# Patient Record
Sex: FEMALE | Race: WHITE
[De-identification: names, ages, dates, MRNs, and addresses within clinical notes are randomized per-mention and may not be internally consistent; named-entity substitution may affect disease eponyms.]

---

## 2018-06-25 ENCOUNTER — HOSPITAL ENCOUNTER (EMERGENCY)
Dept: HOSPITAL 97 - ER | Age: 38
Discharge: HOME | End: 2018-06-25
Payer: COMMERCIAL

## 2018-06-25 DIAGNOSIS — L24.9: Primary | ICD-10-CM

## 2018-06-25 DIAGNOSIS — H66.009: ICD-10-CM

## 2018-06-25 PROCEDURE — 99283 EMERGENCY DEPT VISIT LOW MDM: CPT

## 2018-06-25 PROCEDURE — 96372 THER/PROPH/DIAG INJ SC/IM: CPT

## 2018-06-25 NOTE — EDPHYS
Physician Documentation                                                                           

 [facility]                                                                

Name: Doris Hadley                                                                                

Age: 38 yrs                                                                                       

Sex: Female                                                                                       

: 1980                                                                                   

MRN: [id number]                                                                                   

Arrival Date: 2018                                                                          

Time: 17:18                                                                                       

Account#: [id number]                                                                            

Bed 27                                                                                            

Private MD: FLORENCIO HEADLEY ED Physician Christopher Velazquez                                                                      

HPI:                                                                                              

                                                                                             

18:21 This 38 yrs old  Female presents to ER via Ambulatory with complaints of       snw 

      Allergic Reaction.                                                                          

18:21 The patient presents with localized swelling. Onset: The symptoms/episode               snw 

      began/occurred suddenly, 2 day(s) ago, and became worse and became persistent.              

      Associated signs and symptoms: Pertinent positives: The patient has no apparent             

      associated signs or symptoms. Possible causes: sun/sand/excessive heat/wind. Severity       

      of symptoms: At their worst the symptoms were moderate. The patient has not experienced     

      similar symptoms in the past. The patient has not recently seen a physician.                

                                                                                                  

OB/GYN:                                                                                           

17:30 LMP 2018                                                                           aj  

                                                                                                  

Historical:                                                                                       

- Allergies:                                                                                      

17:30 No Known Allergies;                                                                     aj  

- Home Meds:                                                                                      

17:30 None [Active];                                                                          aj  

- PMHx:                                                                                           

17:30 None;                                                                                   aj  

- PSHx:                                                                                           

17:30 None;                                                                                   aj  

                                                                                                  

- Immunization history:: Adult Immunizations up to date.                                          

- Social history:: Smoking status: Patient uses tobacco products, smokes one-half pack            

  cigarettes per day.                                                                             

- Ebola Screening: : Patient negative for fever greater than or equal to 101.5 degrees            

  Fahrenheit, and additional compatible Ebola Virus Disease symptoms Patient denies               

  exposure to infectious person Patient denies travel to an Ebola-affected area in the            

  21 days before illness onset No symptoms or risks identified at this time.                      

                                                                                                  

                                                                                                  

ROS:                                                                                              

18:20 Constitutional: Negative for fever, chills, and weight loss, positive forehead, facial  snw 

      edema Eyes: Negative for injury, pain, redness, and discharge, ENT: Negative for            

      injury, pain, and discharge, Neck: Negative for injury, pain, and swelling,                 

      Cardiovascular: Negative for chest pain, palpitations, and edema, Respiratory: Negative     

      for shortness of breath, cough, wheezing, and pleuritic chest pain, Abdomen/GI:             

      Negative for abdominal pain, nausea, vomiting, diarrhea, and constipation, Back:            

      Negative for injury and pain, : Negative for injury, bleeding, discharge, and             

      swelling, MS/Extremity: Negative for injury and deformity, Skin: Negative for injury,       

      rash, and discoloration, Neuro: Negative for headache, weakness, numbness, tingling,        

      and seizure, Psych: Negative for depression, anxiety, suicide ideation, homicidal           

      ideation, and hallucinations.                                                               

                                                                                                  

Exam:                                                                                             

18:19 Constitutional:  This is a well developed, well nourished patient who is awake, alert,  snw 

      and in no acute distress. Eyes:  Pupils equal round and reactive to light, extra-ocular     

      motions intact.  Lids and lashes normal.  Conjunctiva and sclera are non-icteric and        

      not injected.  Cornea within normal limits.  Periorbital areas with no swelling,            

      redness, or edema. Neck:  Trachea midline, no thyromegaly or masses palpated, and no        

      cervical lymphadenopathy.  Supple, full range of motion without nuchal rigidity, or         

      vertebral point tenderness.  No Meningismus. Chest/axilla:  Normal chest wall               

      appearance and motion.  Nontender with no deformity.  No lesions are appreciated.           

      Cardiovascular:  Regular rate and rhythm with a normal S1 and S2.  No gallops, murmurs,     

      or rubs.  Normal PMI, no JVD.  No pulse deficits. Respiratory:  Lungs have equal breath     

      sounds bilaterally, clear to auscultation and percussion.  No rales, rhonchi or wheezes     

      noted.  No increased work of breathing, no retractions or nasal flaring. Abdomen/GI:        

      Soft, non-tender, with normal bowel sounds.  No distension or tympany.  No guarding or      

      rebound.  No evidence of tenderness throughout. Back:  No spinal tenderness.  No            

      costovertebral tenderness.  Full range of motion. Skin:  Warm, dry with normal turgor.      

      Normal color with no rashes, no lesions, and no evidence of cellulitis. MS/ Extremity:      

      Pulses equal, no cyanosis.  Neurovascular intact.  Full, normal range of motion. Neuro:     

       Awake and alert, GCS 15, oriented to person, place, time, and situation.  Cranial          

      nerves II-XII grossly intact.  Motor strength 5/5 in all extremities.  Sensory grossly      

      intact.  Cerebellar exam normal.  Normal gait.                                              

18:19 Head/face: Noted is swelling, that is moderate, of the  forehead, right eye and left        

      eye.                                                                                        

18:19 ENT: TM's: erythema, that is moderate, on the left, Nose: is normal, Mouth: is normal,      

      Posterior pharynx: is normal, Voice: is normal.                                             

                                                                                                  

Vital Signs:                                                                                      

17:30  / 65; Pulse 82; Resp 19; Temp 97.4; Pulse Ox 99% on R/A; Weight 61.23 kg; Height aj  

      5 ft. 4 in. (162.56 cm);                                                                    

18:57  / 67; Pulse 79; Resp 20; Temp 98.5; Pulse Ox 99% on R/A;                         aj  

17:30 Body Mass Index 23.17 (61.23 kg, 162.56 cm)                                             aj  

                                                                                                  

MDM:                                                                                              

17:54 Patient medically screened.                                                             crissy 

18:23 Data reviewed: vital signs, nurses notes. Data interpreted: Pulse oximetry: on room air snw 

      is 99 %. Counseling: I had a detailed discussion with the patient and/or guardian           

      regarding: the historical points, exam findings, and any diagnostic results supporting      

      the discharge/admit diagnosis, the need for outpatient follow up, to return to the          

      emergency department if symptoms worsen or persist or if there are any questions or         

      concerns that arise at home. Special discussion: Based on the history and exam              

      findings, there is no indication for further emergent testing or inpatient evaluation.      

      I discussed with the patient/guardian the need to see the primary care provider for         

      further evaluation of the symptoms.                                                         

                                                                                                  

                                                                                             

17:36 Order name: Diet Regular; Complete Time: 17:37                                          bd  

                                                                                             

18:15 Order name: Ice pack; Complete Time: 18:39                                              snw 

                                                                                                  

Administered Medications:                                                                         

18:38 Drug: Bicillin L-A 2.4 million units {Note: Split in two doses and given in bilateral   ed1 

      gluteus.} Route: IM; Site: right gluteus;                                                   

18:58 Follow up: Response: No adverse reaction                                                  

                                                                                                  

                                                                                                  

Disposition:                                                                                      

                                                                                             

06:55 Co-signature as Attending Physician, Christopher Velazquez MD I agree with the assessment and  [facility] 

      plan of care.                                                                               

                                                                                                  

Disposition:                                                                                      

18 18:23 Discharged to Home. Impression: Acute suppurative otitis media, Irritant           

  contact dermatitis, unspecified cause.                                                          

- Condition is Stable.                                                                            

- Discharge Instructions: Contact Dermatitis, Otitis Media, Adult, Cryotherapy.                   

                                                                                                  

- Medication Reconciliation Form, Thank You Letter, Antibiotic Education, Prescription            

  Opioid Use form.                                                                                

- Follow up: Private Physician; When: 2 - 3 days; Reason: Recheck today's complaints,             

  Continuance of care, Re-evaluation by your physician. Follow up: Emergency                      

  Department; When: As needed; Reason: Worsening of condition.                                    

                                                                                                  

                                                                                                  

                                                                                                  

Signatures:                                                                                       

Jadyn King, RN                       Christopher Srinivasan MD MD cha Therrien, Shelly, FNP-C                 FNP-Csnw                                                  

[place], FAREED                       LVN  ed1                                                  

                                                                                                  

Corrections: (The following items were deleted from the chart)                                    

                                                                                             

18:58 18:23 2018 18:23 Discharged to Home. Impression: Acute suppurative otitis media;  aj  

      Irritant contact dermatitis, unspecified cause. Condition is Stable. Forms are              

      Medication Reconciliation Form, Thank You Letter, Antibiotic Education, Prescription        

      Opioid Use. Follow up: Private Physician; When: 2 - 3 days; Reason: Recheck today's         

      complaints, Continuance of care, Re-evaluation by your physician. Follow up: Emergency      

      Department; When: As needed; Reason: Worsening of condition. snw                            

                                                                                                  

**************************************************************************************************

## 2018-06-25 NOTE — ER
Nurse's Notes                                                                                     

 [facility]                                                                

Name: Doris Hadley                                                                                

Age: 38 yrs                                                                                       

Sex: Female                                                                                       

: 1980                                                                                   

MRN: [id number]                                                                                   

Arrival Date: 2018                                                                          

Time: 17:18                                                                                       

Account#: [id number]                                                                            

Bed 27                                                                                            

Private MD: FLORENCIO HEADLEY                                                                          

Diagnosis: Acute suppurative otitis media;Irritant contact dermatitis, unspecified cause          

                                                                                                  

Presentation:                                                                                     

                                                                                             

17:27 Presenting complaint: Patient states: Swelling to forehead and eye lids that started 3  aj  

      days ago. Transition of care: patient was not received from another setting of care.        

      Onset: The symptoms/episode began/occurred 3 day(s) ago. Anaphylaxis evaluation, no         

      signs or symptoms of anaphylaxis were noted. Onset of symptoms was 2018. Risk      

      Assessment: Do you want to hurt yourself or someone else? Patient reports no desire to      

      harm self or others. Initial Sepsis Screen: Does the patient meet any 2 criteria? No.       

      Patient's initial sepsis screen is negative. Does the patient have a suspected source       

      of infection? No. Patient's initial sepsis screen is negative. Care prior to arrival:       

      None.                                                                                       

17:27 Method Of Arrival: Ambulatory                                                             

17:27 Acuity: ANTHONY 4                                                                           aj  

                                                                                                  

Triage Assessment:                                                                                

17:30 General: Appears in no apparent distress. comfortable, Behavior is calm, cooperative,   aj  

      appropriate for age. Pain: Denies pain. Neuro: Level of Consciousness is awake, alert,      

      obeys commands, Oriented to person, place, time, situation, Appropriate for age.            

      Respiratory: Airway is patent Respiratory effort is even, unlabored, Respiratory            

      pattern is regular, symmetrical. Derm: Skin is intact, is healthy with good turgor,         

      Skin is pink, warm \T\ dry. normal.                                                         

                                                                                                  

OB/GYN:                                                                                           

17:30 LMP 2018                                                                             

                                                                                                  

Historical:                                                                                       

- Allergies:                                                                                      

17:30 No Known Allergies;                                                                     aj  

- Home Meds:                                                                                      

17:30 None [Active];                                                                          aj  

- PMHx:                                                                                           

17:30 None;                                                                                   aj  

- PSHx:                                                                                           

17:30 None;                                                                                   aj  

                                                                                                  

- Immunization history:: Adult Immunizations up to date.                                          

- Social history:: Smoking status: Patient uses tobacco products, smokes one-half pack            

  cigarettes per day.                                                                             

- Ebola Screening: : Patient negative for fever greater than or equal to 101.5 degrees            

  Fahrenheit, and additional compatible Ebola Virus Disease symptoms Patient denies               

  exposure to infectious person Patient denies travel to an Ebola-affected area in the            

  21 days before illness onset No symptoms or risks identified at this time.                      

                                                                                                  

                                                                                                  

Screenin:41 Abuse screen: Denies threats or abuse. Denies injuries from another. Nutritional        ed1 

      screening: No deficits noted. Tuberculosis screening: No symptoms or risk factors           

      identified. Fall Risk None identified.                                                      

                                                                                                  

Assessment:                                                                                       

17:41 General: Appears uncomfortable, Behavior is calm, cooperative. Pain: Complains of pain  ed1 

      in right eye and left eye Pain does not radiate. Pain currently is 6 out of 10 on a         

      pain scale. Quality of pain is described as throbbing, Pain began 1 day ago. Is             

      continuous. Neuro: Level of Consciousness is awake, alert, obeys commands, Oriented to      

      person, place, time, situation, Reports blurred vision. Cardiovascular: Denies chest        

      pain, Heart tones S1 S2 present. Respiratory: Airway is patent Respiratory effort is        

      even, unlabored, Respiratory pattern is regular, symmetrical, Breath sounds are clear       

      bilaterally. Denies cough, shortness of breath. GI: No signs and/or symptoms were           

      reported involving the gastrointestinal system. : No signs and/or symptoms were           

      reported regarding the genitourinary system. EENT: No signs and/or symptoms were            

      reported regarding the EENT system. Derm: Skin is pink, warm \T\ dry. Musculoskeletal:      

      Swelling present in forehead, right eye and left eye.                                       

18:00 Reassessment: The previous assessment is accurate, call light remains within reach.       

                                                                                                  

Vital Signs:                                                                                      

17:30  / 65; Pulse 82; Resp 19; Temp 97.4; Pulse Ox 99% on R/A; Weight 61.23 kg; Height aj  

      5 ft. 4 in. (162.56 cm);                                                                    

18:57  / 67; Pulse 79; Resp 20; Temp 98.5; Pulse Ox 99% on R/A;                         aj  

17:30 Body Mass Index 23.17 (61.23 kg, 162.56 cm)                                               

                                                                                                  

ED Course:                                                                                        

17:18 Patient arrived in ED.                                                                  sb2 

17:18 FLORENCIO HEADLEY is Private Physician.                                                      sb2 

17:29 Triage completed.                                                                       aj  

17:30 Arm band placed on right wrist. Patient placed in an exam room.                         aj  

17:38 La Preciado LVN is Primary Nurse.                                                     ed1 

17:41 Patient has correct armband on for positive identification. Bed in low position. Call   ed1 

      light in reach.                                                                             

17:42 [facility]                                                        snw 

17:42 Christopher Velazquez MD is Attending Physician.                                             snw 

18:57 No provider procedures requiring assistance completed. Patient did not have IV access   aj  

      during this emergency room visit.                                                           

                                                                                                  

Administered Medications:                                                                         

18:38 Drug: Bicillin L-A 2.4 million units {Note: Split in two doses and given in bilateral   ed1 

      gluteus.} Route: IM; Site: right gluteus;                                                   

18:58 Follow up: Response: No adverse reaction                                                aj  

                                                                                                  

                                                                                                  

Outcome:                                                                                          

18:23 Discharge ordered by MD.                                                                snw 

18:57 Discharged to home ambulatory, with family.                                             aj  

18:57 Condition: good                                                                             

18:57 Discharge instructions given to patient, Instructed on discharge instructions, follow       

      up and referral plans. Demonstrated understanding of instructions, follow-up care.          

18:58 Patient left the ED.                                                                    aj  

                                                                                                  

Signatures:                                                                                       

Jadyn King, RN                       RN   Katie Goss FNP-C FNP-Sayda Musa RN                      RN                                                      

La Preciado, AYSHAN                       LVN  ed1                                                  

Julissa Reid                               sb2                                                  

                                                                                                  

**************************************************************************************************